# Patient Record
Sex: FEMALE | URBAN - METROPOLITAN AREA
[De-identification: names, ages, dates, MRNs, and addresses within clinical notes are randomized per-mention and may not be internally consistent; named-entity substitution may affect disease eponyms.]

---

## 2023-11-28 ENCOUNTER — HOSPITAL ENCOUNTER (EMERGENCY)
Facility: CLINIC | Age: 38
Discharge: LEFT BEFORE DISCHARGE | End: 2023-11-28

## 2023-11-29 NOTE — ED TRIAGE NOTES
Patient has been called 4x from the lobby to be triage. Pt not present or responding to when name called. Registration stated that she left.